# Patient Record
Sex: MALE | Race: WHITE | URBAN - METROPOLITAN AREA
[De-identification: names, ages, dates, MRNs, and addresses within clinical notes are randomized per-mention and may not be internally consistent; named-entity substitution may affect disease eponyms.]

---

## 2024-02-05 RX ORDER — ACETAMINOPHEN 325 MG/1
650 TABLET ORAL 2 TIMES DAILY
COMMUNITY

## 2024-02-05 RX ORDER — ONDANSETRON HYDROCHLORIDE 8 MG/1
8 TABLET, FILM COATED ORAL 3 TIMES DAILY
COMMUNITY

## 2024-02-05 RX ORDER — DONEPEZIL HYDROCHLORIDE 10 MG/1
10 TABLET, FILM COATED ORAL NIGHTLY
COMMUNITY

## 2024-02-05 RX ORDER — LANOLIN ALCOHOL/MO/W.PET/CERES
1000 CREAM (GRAM) TOPICAL DAILY
COMMUNITY

## 2024-02-05 RX ORDER — MEMANTINE HYDROCHLORIDE 10 MG/1
10 TABLET ORAL 2 TIMES DAILY
COMMUNITY

## 2024-02-05 RX ORDER — OLANZAPINE 2.5 MG/1
2.5 TABLET, FILM COATED ORAL NIGHTLY
COMMUNITY

## 2024-02-05 NOTE — PROGRESS NOTES
Date and time of surgery : 2/6/24 at 1200             Arrival Time:  1100     Bring Picture ID and insurance card.  Please wear simple, loose fitting clothing to the hospital.   Do not bring valuables (money, credit cards, checkbooks, etc.)   DO NOT wear any jewelry or piercings on day of surgery.  All body piercing jewelry must be removed.  If you have dentures, they will be removed before going to the OR; we will provide you a container.  If you wear contact lenses or glasses, they will be removed; please bring a case for them.  Shower the evening before or morning of surgery with antibacterial soap.  Nothing to eat or drink after midnight the day before surgery.   You may brush your teeth and gargle the morning of surgery.  DO NOT SWALLOW WATER.   Do not take any morning meds the day of your surgery.  Aspirin, Ibuprofen, Advil, Naproxen, Vitamin E and other Anti-inflammatory products and supplements should be stopped for 5 -7days before surgery or as directed by your physician.  Do not smoke or drink any alcoholic beverages 24 hours prior to surgery.  This includes NA Beer. Refrain from the usage of any recreational drugs, including non-prescribed prescription drugs.   You MUST plan for a responsible adult to stay on site while you are here and take you home after your surgery. You will not be allowed to leave alone or drive yourself home. It is strongly suggested someone stay with you the first 24 hrs. Your surgery will be cancelled if you do not have a ride home.  To help prevent infection, change your sheets the night before surgery.   If you  have a Living Will and Durable Power of  for Healthcare, please bring in a copy.  Notify your Surgeon if you develop any illness between now and time of surgery. Cough, cold, fever, sore throat, nausea, vomiting, etc.  Please notify your surgeon if you experience dizziness, shortness of breath or blurred vision between now & the time of your surgery  To provide

## 2024-02-05 NOTE — PROGRESS NOTES
Gordo Pack    Age 78 y.o.    male    1945    MRN [id number]    2/13/2024  Arrival Time_____________  OR Time____________85 Min     Procedure(s):  SURGICAL EXTRACTION TOOTH # 12                      General   Surgeon(s):  Kev Smith, DMD      DAY ADMIT ___  SDS/OP ___  OUTPT IN BED ___        Phone [phone] (home)     PCP _____________________ Phone_________________ Epic ( ) Epic CE ( ) Appt ________    ADDITIONAL INFO __________________________________ Cardio/Consult _____________    NOTES _____________________________________________________________________    ____________________________________________________________________________    PAT APPT DATE:________ TIME: ________  FAXED QAD: _______  (__) H&P w/ Hospitalist  __________________________________________________________________________  Preop Nurse phone screen complete: _____________  (__) CBC     (__) W/ DIFF ___________     (__) Hgb A1C    ___________  (__) CHEST X RAY   __________  (__) LIPID PROFILE  ___________  (__) EKG   __________  (__) PT-INR / APTT  ___________  (__) PFT's   __________  (__) BMP   ___________  (__) CAROTIDS  __________  (__) CMP   ___________  (__) VEIN MAPPING  __________  (__) U/A   ___________  (__) HISTORY & PHYSICAL __________  (__) URINE C & S  ___________  (__) CARDIAC CLEARANCE __________  (__) U/A W/ FLEX  ___________  (__) PULM. CLEARANCE __________  (__) SERUM PREGNANCY ___________  (__) Check Epic DOS orders __________  (__) TYPE & SCREEN __________repeat ( ) (__)  __________________ __________  (__) Albumin / Prealbumin ___________  (__)  __________________ __________  (__) TRANSFERRIN  ___________  (__)  __________________ __________  (__) LIVER PROFILE  ___________  (__)  __________________ __________  (__) MRSA NASAL SWAB ___________  (__) URINE PREG DOS __________  (__) SED RATE  ___________  (__) BLOOD SUGAR DOS __________  (__) C-REACTIVE PROTEIN ___________    (__) VITAMIN D

## 2024-02-05 NOTE — PROGRESS NOTES
Gordo T Linz    Age 78 y.o.    male    1945    MRN [id number]    2/6/2024  Arrival Time_____________  OR Time____________45 Min     Procedure(s):  SURGICAL EXTRACTION TOOTH # 12                      General    Surgeon(s):  Kev Smith, DMD       Phone [phone] (home)     Initals  Date  Info Source  Home  Cell         Work  _____________________________________________________________________  _____________________________________________________________________  _____________________________________________________________________  _____________________________________________________________________  _____________________________________________________________________    PCP _____________________________ Phone_________________     H&P  ________________  Bringing      Chart              Epic      DOS      Called________  EKG ________________   Bringing      Chart              Epic      DOS      Called________  LABS________________   Bringing     Chart              Epic      DOS      Called________  Cardiac Clearance ______ Bringing      Chart              Epic      DOS      Called________  Pulmonary Clearance____ Bringing      Chart              Epic      DOS      Called________    Cardiologist________________________ Phone___________________________  Pulmonologist_______________________Phone___________________________    ? Advance Directives   ? Church concerns / Waiver on Chart            PAT Communications________________  ? Pre-op Instructions Given /Understood          _________________________________  ? Directions to Surgery Center                          _________________________________  ? Transportation Home_______________      __________________________________  ? Crutches/Walker__________________        __________________________________    Orders: Hard copy/ EPIC                 Transcribed/ EPIC              _______Wt.    ________Pharmacy                         _______SCD

## 2024-02-06 NOTE — PROGRESS NOTES
POA, Riana Connolly, (sister), will not be present DOS. POA can be reached by phone DOS for verbal consent. [phone] or cell [phone]. Family does not want to suspend DNR for surgery. Anesthesia will also need verbal consent. Brother, Eric, bringing patient aware. Brother advised to bring POA, DNR papers DOS. V/U.

## 2024-02-06 NOTE — PROGRESS NOTES
Surgery Date and Time: 2/13/24 @ 11:00 AM   Arrival Time:  09:00 AM    The instructions given when and if a patient needs to stop oral intake prior to surgery varies. Follow the instructions you were given by your    Surgeon or RN during the Pre-op call.       __X__Nothing to eat or to drink after Midnight the night before the surgery. NO gum, mints, candy or ice chips day of surgery.                  Only take the following medications with a small sip of water the morning of surgery: Lorazepam                Aspirin, Ibuprofen, Advil, Naproxen, Vitamin E and other Anti-inflammatory products and supplements should be stopped for 5 -7days before surgery      or as directed by your physician.     - Do not smoke or vape, and do not drink any alcoholic beverages 24 hours prior to surgery, this includes NA Beer. Refrain from using any recreational drugs,     including non-prescribed prescription drugs.       -You may brush your teeth and gargle the morning of surgery.  DO NOT SWALLOW WATER.      -You MUST plan for a responsible adult to stay on site while you are here and take you home after your surgery. You will not be allowed to leave alone or drive               yourself home. It is requested someone stay with you the first 24 hrs. Your surgery will be cancelled if you do not have a ride home with a responsible adult.      -A parent/legal guardian must accompany a child scheduled for surgery and plan to stay at the hospital until the child is discharged.  Please do not bring other                children with you.      -Please wear simple, loose-fitting clothing to the hospital. Do not bring valuables (money, credit cards, checkbooks, etc.) Do not wear any makeup (including                no eye makeup) and no nail polish if applicable.               - DO NOT wear any jewelry or body piercings day of surgery.  All body piercing jewelry must be removed.               - If you have dentures they will be removed

## 2024-02-12 ENCOUNTER — ANESTHESIA EVENT (OUTPATIENT)
Dept: OPERATING ROOM | Age: 79
End: 2024-02-12

## 2024-02-12 RX ORDER — LORAZEPAM 0.5 MG/1
0.5 TABLET ORAL
COMMUNITY

## 2024-02-13 ENCOUNTER — HOSPITAL ENCOUNTER (OUTPATIENT)
Age: 79
Setting detail: OUTPATIENT SURGERY
Discharge: HOME | End: 2024-02-13
Attending: DENTIST | Admitting: DENTIST

## 2024-02-13 ENCOUNTER — ANESTHESIA (OUTPATIENT)
Dept: OPERATING ROOM | Age: 79
End: 2024-02-13

## 2024-02-13 VITALS
WEIGHT: 180 LBS | HEIGHT: 69 IN | RESPIRATION RATE: 8 BRPM | OXYGEN SATURATION: 95 % | DIASTOLIC BLOOD PRESSURE: 67 MMHG | TEMPERATURE: 98.2 F | SYSTOLIC BLOOD PRESSURE: 128 MMHG | BODY MASS INDEX: 26.66 KG/M2 | HEART RATE: 77 BPM

## 2024-02-13 PROCEDURE — A4217 STERILE WATER/SALINE, 500 ML: HCPCS | Performed by: DENTIST

## 2024-02-13 PROCEDURE — 2500000003 HC RX 250 WO HCPCS: Performed by: NURSE ANESTHETIST, CERTIFIED REGISTERED

## 2024-02-13 PROCEDURE — 7100000000 HC PACU RECOVERY - FIRST 15 MIN: Performed by: DENTIST

## 2024-02-13 PROCEDURE — 2709999900 HC NON-CHARGEABLE SUPPLY: Performed by: DENTIST

## 2024-02-13 PROCEDURE — 7100000011 HC PHASE II RECOVERY - ADDTL 15 MIN: Performed by: DENTIST

## 2024-02-13 PROCEDURE — 6370000000 HC RX 637 (ALT 250 FOR IP): Performed by: DENTIST

## 2024-02-13 PROCEDURE — 3600000003 HC SURGERY LEVEL 3 BASE: Performed by: DENTIST

## 2024-02-13 PROCEDURE — 2500000003 HC RX 250 WO HCPCS: Performed by: DENTIST

## 2024-02-13 PROCEDURE — 3700000001 HC ADD 15 MINUTES (ANESTHESIA): Performed by: DENTIST

## 2024-02-13 PROCEDURE — 2580000003 HC RX 258: Performed by: DENTIST

## 2024-02-13 PROCEDURE — 7100000010 HC PHASE II RECOVERY - FIRST 15 MIN: Performed by: DENTIST

## 2024-02-13 PROCEDURE — 3700000000 HC ANESTHESIA ATTENDED CARE: Performed by: DENTIST

## 2024-02-13 PROCEDURE — 2580000003 HC RX 258: Performed by: ANESTHESIOLOGY

## 2024-02-13 PROCEDURE — 7100000001 HC PACU RECOVERY - ADDTL 15 MIN: Performed by: DENTIST

## 2024-02-13 PROCEDURE — 6360000002 HC RX W HCPCS: Performed by: DENTIST

## 2024-02-13 PROCEDURE — 6360000002 HC RX W HCPCS: Performed by: NURSE ANESTHETIST, CERTIFIED REGISTERED

## 2024-02-13 PROCEDURE — 3600000013 HC SURGERY LEVEL 3 ADDTL 15MIN: Performed by: DENTIST

## 2024-02-13 RX ORDER — AMOXICILLIN AND CLAVULANATE POTASSIUM 250; 62.5 MG/5ML; MG/5ML
250 POWDER, FOR SUSPENSION ORAL 2 TIMES DAILY
COMMUNITY

## 2024-02-13 RX ORDER — ONDANSETRON 2 MG/ML
4 INJECTION INTRAMUSCULAR; INTRAVENOUS
Status: DISCONTINUED | OUTPATIENT
Start: 2024-02-13 | End: 2024-02-13 | Stop reason: HOSPADM

## 2024-02-13 RX ORDER — ACETAMINOPHEN 650 MG/20.3ML
15 SUSPENSION ORAL EVERY 4 HOURS PRN
COMMUNITY

## 2024-02-13 RX ORDER — SODIUM CHLORIDE 0.9 % (FLUSH) 0.9 %
5-40 SYRINGE (ML) INJECTION EVERY 12 HOURS SCHEDULED
Status: DISCONTINUED | OUTPATIENT
Start: 2024-02-13 | End: 2024-02-13 | Stop reason: HOSPADM

## 2024-02-13 RX ORDER — PROPOFOL 10 MG/ML
INJECTION, EMULSION INTRAVENOUS PRN
Status: DISCONTINUED | OUTPATIENT
Start: 2024-02-13 | End: 2024-02-13 | Stop reason: SDUPTHER

## 2024-02-13 RX ORDER — CHLORHEXIDINE GLUCONATE ORAL RINSE 1.2 MG/ML
15 SOLUTION DENTAL
Status: DISCONTINUED | OUTPATIENT
Start: 2024-02-13 | End: 2024-02-13 | Stop reason: HOSPADM

## 2024-02-13 RX ORDER — OXYMETAZOLINE HYDROCHLORIDE 0.05 G/100ML
2 SPRAY NASAL ONCE
Status: DISCONTINUED | OUTPATIENT
Start: 2024-02-13 | End: 2024-02-13 | Stop reason: HOSPADM

## 2024-02-13 RX ORDER — LIDOCAINE HYDROCHLORIDE 10 MG/ML
1 INJECTION, SOLUTION EPIDURAL; INFILTRATION; INTRACAUDAL; PERINEURAL
Status: DISCONTINUED | OUTPATIENT
Start: 2024-02-13 | End: 2024-02-13 | Stop reason: HOSPADM

## 2024-02-13 RX ORDER — CHLORHEXIDINE GLUCONATE ORAL RINSE 1.2 MG/ML
SOLUTION DENTAL PRN
Status: DISCONTINUED | OUTPATIENT
Start: 2024-02-13 | End: 2024-02-13 | Stop reason: ALTCHOICE

## 2024-02-13 RX ORDER — MAGNESIUM HYDROXIDE 1200 MG/15ML
LIQUID ORAL CONTINUOUS PRN
Status: COMPLETED | OUTPATIENT
Start: 2024-02-13 | End: 2024-02-13

## 2024-02-13 RX ORDER — OXYCODONE HYDROCHLORIDE 5 MG/1
5 TABLET ORAL
Status: DISCONTINUED | OUTPATIENT
Start: 2024-02-13 | End: 2024-02-13 | Stop reason: HOSPADM

## 2024-02-13 RX ORDER — SODIUM CHLORIDE 9 MG/ML
INJECTION, SOLUTION INTRAVENOUS PRN
Status: DISCONTINUED | OUTPATIENT
Start: 2024-02-13 | End: 2024-02-13 | Stop reason: HOSPADM

## 2024-02-13 RX ORDER — LIDOCAINE HYDROCHLORIDE 20 MG/ML
INJECTION, SOLUTION INFILTRATION; PERINEURAL PRN
Status: DISCONTINUED | OUTPATIENT
Start: 2024-02-13 | End: 2024-02-13 | Stop reason: SDUPTHER

## 2024-02-13 RX ORDER — MEPERIDINE HYDROCHLORIDE 50 MG/ML
12.5 INJECTION INTRAMUSCULAR; INTRAVENOUS; SUBCUTANEOUS EVERY 5 MIN PRN
Status: DISCONTINUED | OUTPATIENT
Start: 2024-02-13 | End: 2024-02-13 | Stop reason: HOSPADM

## 2024-02-13 RX ORDER — SODIUM CHLORIDE, SODIUM LACTATE, POTASSIUM CHLORIDE, CALCIUM CHLORIDE 600; 310; 30; 20 MG/100ML; MG/100ML; MG/100ML; MG/100ML
INJECTION, SOLUTION INTRAVENOUS CONTINUOUS
Status: DISCONTINUED | OUTPATIENT
Start: 2024-02-13 | End: 2024-02-13 | Stop reason: HOSPADM

## 2024-02-13 RX ORDER — SODIUM CHLORIDE 0.9 % (FLUSH) 0.9 %
5-40 SYRINGE (ML) INJECTION PRN
Status: DISCONTINUED | OUTPATIENT
Start: 2024-02-13 | End: 2024-02-13 | Stop reason: HOSPADM

## 2024-02-13 RX ORDER — LIDOCAINE HYDROCHLORIDE AND EPINEPHRINE 10; 10 MG/ML; UG/ML
INJECTION, SOLUTION INFILTRATION; PERINEURAL PRN
Status: DISCONTINUED | OUTPATIENT
Start: 2024-02-13 | End: 2024-02-13 | Stop reason: ALTCHOICE

## 2024-02-13 RX ORDER — CHLORHEXIDINE GLUCONATE ORAL RINSE 1.2 MG/ML
15 SOLUTION DENTAL ONCE
Status: DISCONTINUED | OUTPATIENT
Start: 2024-02-13 | End: 2024-02-13 | Stop reason: HOSPADM

## 2024-02-13 RX ORDER — MIDAZOLAM HYDROCHLORIDE 1 MG/ML
2 INJECTION INTRAMUSCULAR; INTRAVENOUS
Status: DISCONTINUED | OUTPATIENT
Start: 2024-02-13 | End: 2024-02-13 | Stop reason: HOSPADM

## 2024-02-13 RX ORDER — CEFAZOLIN SODIUM IN 0.9 % NACL 2 G/100 ML
2000 PLASTIC BAG, INJECTION (ML) INTRAVENOUS
Status: COMPLETED | OUTPATIENT
Start: 2024-02-13 | End: 2024-02-13

## 2024-02-13 RX ORDER — DEXAMETHASONE SODIUM PHOSPHATE 4 MG/ML
INJECTION, SOLUTION INTRA-ARTICULAR; INTRALESIONAL; INTRAMUSCULAR; INTRAVENOUS; SOFT TISSUE PRN
Status: DISCONTINUED | OUTPATIENT
Start: 2024-02-13 | End: 2024-02-13 | Stop reason: SDUPTHER

## 2024-02-13 RX ORDER — SUCCINYLCHOLINE CHLORIDE 20 MG/ML
INJECTION INTRAMUSCULAR; INTRAVENOUS PRN
Status: DISCONTINUED | OUTPATIENT
Start: 2024-02-13 | End: 2024-02-13 | Stop reason: SDUPTHER

## 2024-02-13 RX ORDER — IPRATROPIUM BROMIDE AND ALBUTEROL SULFATE 2.5; .5 MG/3ML; MG/3ML
1 SOLUTION RESPIRATORY (INHALATION)
Status: DISCONTINUED | OUTPATIENT
Start: 2024-02-13 | End: 2024-02-13 | Stop reason: HOSPADM

## 2024-02-13 RX ORDER — OXYCODONE HYDROCHLORIDE 5 MG/1
10 TABLET ORAL PRN
Status: DISCONTINUED | OUTPATIENT
Start: 2024-02-13 | End: 2024-02-13 | Stop reason: HOSPADM

## 2024-02-13 RX ORDER — ONDANSETRON 2 MG/ML
INJECTION INTRAMUSCULAR; INTRAVENOUS PRN
Status: DISCONTINUED | OUTPATIENT
Start: 2024-02-13 | End: 2024-02-13 | Stop reason: SDUPTHER

## 2024-02-13 RX ORDER — PROCHLORPERAZINE EDISYLATE 5 MG/ML
5 INJECTION INTRAMUSCULAR; INTRAVENOUS
Status: DISCONTINUED | OUTPATIENT
Start: 2024-02-13 | End: 2024-02-13 | Stop reason: HOSPADM

## 2024-02-13 RX ADMIN — SUCCINYLCHOLINE CHLORIDE 140 MG: 20 INJECTION, SOLUTION INTRAMUSCULAR; INTRAVENOUS at 10:44

## 2024-02-13 RX ADMIN — ONDANSETRON 2 MG: 2 INJECTION INTRAMUSCULAR; INTRAVENOUS at 10:50

## 2024-02-13 RX ADMIN — DEXAMETHASONE SODIUM PHOSPHATE 8 MG: 4 INJECTION, SOLUTION INTRAMUSCULAR; INTRAVENOUS at 10:50

## 2024-02-13 RX ADMIN — LIDOCAINE HYDROCHLORIDE 100 MG: 20 INJECTION, SOLUTION INFILTRATION; PERINEURAL at 10:44

## 2024-02-13 RX ADMIN — PROPOFOL 120 MG: 10 INJECTION, EMULSION INTRAVENOUS at 10:44

## 2024-02-13 RX ADMIN — Medication 2000 MG: at 10:47

## 2024-02-13 RX ADMIN — SODIUM CHLORIDE, SODIUM LACTATE, POTASSIUM CHLORIDE, AND CALCIUM CHLORIDE: .6; .31; .03; .02 INJECTION, SOLUTION INTRAVENOUS at 10:38

## 2024-02-13 NOTE — PROGRESS NOTES
Patient meets criteria for discharge per policy.  Discharge instructions given to pt's family, verbalized understanding. PIV removed. Patient discharged via wheelchair to the care of their family in stable condition.

## 2024-02-13 NOTE — PROGRESS NOTES
Patient arrived to PACU bay 7, phase one initiated. Placed on bedside monitor, VSS. Report obtained from OR RN and anesthesia. Patient on room air. See flowsheets for assessment. Warm blankets applied. Side rails in place, will monitor patient closely.

## 2024-02-13 NOTE — H&P
The H&P in EPIC was reviewed, the patient was examined, and no change has occurred in the patient's condition since the H&P was completed.    Electronically signed by Kev Smith DMD on 2/13/2024 at 10:06 AM

## 2024-02-13 NOTE — PROGRESS NOTES
Patient admitted to [facility] room 3 in preparation for surgery, VSS. Pt is nonverbal. Consent confirmed with POA (sister Riana)  IV inserted into right FA, LR infusing. Belongings placed on [facility] cart. NPO since 1800. Family at bedside, phone number in system for text updates, call light within reach.

## 2024-02-13 NOTE — DISCHARGE INSTRUCTIONS
Given to patient in written     ANESTHESIA DISCHARGE INSTRUCTIONS    You are under the influence of drugs- do not drink alcohol, drive a car, operate machinery(such as power tools, kitchen appliances, etc), sign legal documents, or make any important decisions for 24 hours (or while on pain medications).   Children should not ride bikes or skate boards or play on gym sets  for 24 hours after surgery.  A responsible adult should be with you for 24 hours.  Rest at home today- increase activity as tolerated.  Progress slowly to a regular diet unless your physician has instructed you otherwise. Drink plenty of water.    CALL YOUR DOCTOR IF YOU:  Have moderate to severe nausea or vomiting AND are unable to hold down fluids or prescribed medications.  Have bright red bloody drainage from your dressing that won't stop oozing.  Do not get relief with your pain medication    NORMAL (POSSIBLE) SIDE EFFECTS FROM ANESTHESIA:     Confusion, temporary memory loss, delayed reaction times in the first 24 hours  Lightheadedness, dizziness, difficulty focusing, blurred vision  Nausea/vomiting can happen  Shivering, feeling cold, sore throat, cough and muscle aches should stop within 24-48 hours  Trouble urinating - call your surgeon if it has been more than 8 hrs  Bruising or soreness at the IV site - call if it remains red, firm or there is drainage             The following instructions are to be followed if you have a known history or diagnosis of sleep apnea:  For all sleep apnea patients:  ? Sleep on your side or sitting up in a chair whenever possible, especially the first 24 hours after surgery.  ? Use only medicines prescribed by your doctor.    ? Do not drink alcohol.  ? If you have a dental device to assist you while at rest, use it at all times for the first 24 hours.  For patients using CPAP machines:  ? Use your CPAP machine during all periods of sleep as usual.  ? Use your CPAP machine during all periods of daytime rest

## 2024-02-13 NOTE — PROGRESS NOTES
Patient alert, off oxygen, tolerating PO. Phase II initiated, VSS.  Pt's family members at bedside.

## 2024-02-13 NOTE — ANESTHESIA PRE PROCEDURE
Department of Anesthesiology  Preprocedure Note       Name:  Gordo Pack   Age:  78 y.o.  :  1945                                          MRN:  [id number]         Date:  2024      Surgeon: Surgeon(s):  Kev Smith DMD    Procedure: Procedure(s):  SURGICAL EXTRACTION TOOTH # 12    Medications prior to admission:   Prior to Admission medications    Medication Sig Start Date End Date Taking? Authorizing Provider   amoxicillin-clavulanate (AUGMENTIN) 250-62.5 MG/5ML suspension Take 5 mLs by mouth 2 times daily   Yes Jl Young MD   acetaminophen (TYLENOL) 650 MG/20.3ML SUSP Take 15 mg/kg by mouth every 4 hours as needed for Pain   Yes Jl Young MD   LORazepam (ATIVAN) 0.5 MG tablet Take 1 tablet by mouth. Indications: take one tablet prior to dental procedure   Yes Jl Young MD   donepezil (ARICEPT) 10 MG tablet Take 1 tablet by mouth nightly   Yes Jl Young MD   memantine (NAMENDA) 10 MG tablet Take 1 tablet by mouth 2 times daily   Yes Jl Young MD   OLANZapine (ZYPREXA) 2.5 MG tablet Take 1 tablet by mouth nightly   Yes Jl Young MD   acetaminophen (TYLENOL) 325 MG tablet Take 2 tablets by mouth in the morning and at bedtime   Yes Jl Young MD   IBUPROFEN PO Take by mouth as needed Liquid   Yes Jl Young MD   ondansetron (ZOFRAN) 8 MG tablet Take 1 tablet by mouth in the morning, at noon, and at bedtime   Yes Jl Young MD   vitamin B-12 (CYANOCOBALAMIN) 1000 MCG tablet Take 1 tablet by mouth daily   Yes Jl Young MD   Cholecalciferol (VITAMIN D) 2000 UNITS CAPS capsule Take by mouth daily    Jl Young MD   LISINOPRIL PO Take 5 mg by mouth daily    Jl oYung MD   simvastatin (ZOCOR) 10 MG tablet Take 4 tablets by mouth nightly    Jl Young MD       Current medications:    Current Facility-Administered Medications   Medication Dose Route

## 2024-02-13 NOTE — OP NOTE
Operative Note      Patient: Gordo Pack  YOB: 1945  MRN: [id number]    Date of Procedure: 2/13/2024    Pre-Op Diagnosis Codes:     * Non-restorable tooth [K08.89] #12    Post-Op Diagnosis: Same       Procedure(s):  SURGICAL EXTRACTION TOOTH # 12    Surgeon(s):  Kev Smith DMD    Assistant:   Surgical Assistant: Karime Lewis     Anesthesia: General    Estimated Blood Loss (mL): Minimal    Complications: None    Specimens: None    Implants: None      Drains: None    Findings: carious #12    Detailed Description of Procedure:     The patient was placed in supine position on the operating table. Following induction of general anesthesia, an oral endotracheal tube was successfully placed and secured. Patient was draped in sterile fashion. 2cc of 1% lidocaine was infiltrated at surgical sites.     Tooth #12 was surgically removed after elevating a full thickness mucoperisteal flap with a 15 blade. Gelfoam was placed in the socket and the site sutures using 3.0 chromic gut.     Throat pack removed.     At the conclusion of the procedure the patient emerged from anesthesia without complication. He was taken to the PACU postoperatively.         Electronically signed by Kev Smith DMD on 2/13/2024 at 11:06 AM

## 2024-02-13 NOTE — ANESTHESIA POSTPROCEDURE EVALUATION
Department of Anesthesiology  Postprocedure Note    Patient: Gordo Pack  MRN: [id number]  YOB: 1945  Date of evaluation: 2/13/2024    Procedure Summary       Date: 02/13/24 Room / Location: [address]    Anesthesia Start: 1038 Anesthesia Stop: 1109    Procedure: SURGICAL EXTRACTION TOOTH # 12 (Mouth) Diagnosis:       Non-restorable tooth      (Non-restorable tooth [K08.89])    Surgeons: Kev Smith DMD Responsible Provider: Allen Valdovinos MD    Anesthesia Type: general ASA Status: 3            Anesthesia Type: No value filed.    Leland Phase I: Leland Score: 10    Leland Phase II: Leland Score: 10    Anesthesia Post Evaluation    Patient location during evaluation: PACU  Patient participation: complete - patient participated  Level of consciousness: awake and alert  Airway patency: patent  Nausea & Vomiting: no nausea and no vomiting  Cardiovascular status: hemodynamically stable  Respiratory status: acceptable  Hydration status: euvolemic  Pain management: adequate    No notable events documented.

## 2024-07-01 ENCOUNTER — HOSPITAL ENCOUNTER (OUTPATIENT)
Dept: SPEECH THERAPY | Age: 79
Setting detail: THERAPIES SERIES
Discharge: HOME | End: 2024-07-01
Payer: MEDICARE

## 2024-07-01 ENCOUNTER — HOSPITAL ENCOUNTER (OUTPATIENT)
Dept: GENERAL RADIOLOGY | Age: 79
Discharge: HOME | End: 2024-07-01
Payer: MEDICARE

## 2024-07-01 DIAGNOSIS — R13.10 DYSPHAGIA, UNSPECIFIED TYPE: ICD-10-CM

## 2024-07-01 PROCEDURE — 92611 MOTION FLUOROSCOPY/SWALLOW: CPT

## 2024-07-01 PROCEDURE — 74230 X-RAY XM SWLNG FUNCJ C+: CPT

## 2024-07-01 NOTE — PROCEDURES
INSTRUMENTAL SWALLOW REPORT  MODIFIED BARIUM SWALLOW    NAME: Gordo Pack     : 1945  MRN: [id number]       Date of Eval: 2024     Ordering Physician: Kate Ly MD  Referring Diagnosis: Dysphagia    Past Medical History:  has a past medical history of Alzheimer's dementia (HCC), Ankle edema, Arthritis, Bradycardia, Cellulitis, Choking, Dementia (HCC), Depression, Developmental disorder, Hyperlipidemia, Hypertension, Infected dental caries, Paget's bone disease, Periapical abscess without sinus, Speech abnormality, and Trigger finger.  Past Surgical History:  has a past surgical history that includes eye surgery (Left); Finger trigger release (Left, 2014); Finger surgery (Left, 2015); Dental surgery (); and Dental surgery (N/A, 2024).    CXR: N/A    Prior MBS Results: N/A    Patient Complaints/Reason for Referral:  Gordo Pack was referred for a MBS to assess the efficiency of his/her swallow function, assess for aspiration, and to make recommendations regarding safe dietary consistencies, effective compensatory strategies, and safe eating environment.    Onset of problem: 26 Pt's family member endorsed ongoing concerns for dysphagia 2/2 coughing and throat clearing over ~ the past year. Pt's sister endorsed pt consumed softer, cut up solids, prior to this assessment.     Behavior/Cognition: Pt alert, nonverbal (baseline per pt's sister 2/2 development disability), and accepting of all boluses during study.  Vision/Hearing: Wears glasses all of the time. Hearing WFL.    Impressions:  Pt presents w/ mild, yet functional, oropharyngeal dysphagia. Pt seated upright and readily accepted all PO. Pt demo'd slightly impaired bolus cohesion and A-P transit as evidenced by posterior escape and piecemeal swallowing ~1/2 of bolus when given thin liquids via single and rapid straw sips. Noted oral stasis and pocketing of soft solids. Required SLP to expectorate whole,

## 2024-12-02 ENCOUNTER — HOSPITAL ENCOUNTER (OUTPATIENT)
Dept: GENERAL RADIOLOGY | Age: 79
Discharge: HOME | End: 2024-12-02
Payer: MEDICARE

## 2024-12-02 DIAGNOSIS — R13.10 DYSPHAGIA, UNSPECIFIED TYPE: ICD-10-CM

## 2024-12-02 DIAGNOSIS — R09.89 CHOKING EPISODE: ICD-10-CM

## 2024-12-02 PROCEDURE — 74230 X-RAY XM SWLNG FUNCJ C+: CPT

## 2024-12-02 PROCEDURE — 92611 MOTION FLUOROSCOPY/SWALLOW: CPT

## 2024-12-02 NOTE — PROCEDURES
INSTRUMENTAL SWALLOW REPORT  MODIFIED BARIUM SWALLOW    NAME: Gordo Pack     : 1945  MRN: [id number]       Date of Eval: 2024     Ordering Physician: Dr. Ly  Referring Diagnosis: Dysphagia    Past Medical History:  has a past medical history of Alzheimer's dementia (HCC), Ankle edema, Arthritis, Bradycardia, Cellulitis, Choking, Dementia (HCC), Depression, Developmental disorder, Hyperlipidemia, Hypertension, Infected dental caries, Paget's bone disease, Periapical abscess without sinus, Speech abnormality, and Trigger finger.  Past Surgical History:  has a past surgical history that includes eye surgery (Left); Finger trigger release (Left, 2014); Finger surgery (Left, 2015); Dental surgery (); and Dental surgery (N/A, 2024).    CXR: none recent     Prior MBS Results: 24  Pt presents w/ mild, yet functional, oropharyngeal dysphagia. Pt seated upright and readily accepted all PO. Pt demo'd slightly impaired bolus cohesion and A-P transit as evidenced by posterior escape and piecemeal swallowing ~1/2 of bolus when given thin liquids via single and rapid straw sips. Noted oral stasis and pocketing of soft solids. Required SLP to expectorate whole, un-masticated bolus out of oral cavity via toothette at the end of the study. Despite delayed swallow initiation w/ posterior loss of thin liquids to the level of the pyriforms pt demo'd adequate airway protection with no instances of penetration or aspiration across all trials. Slightly reduced tongue base retraction and residuals of oral residue, resulted in trace-min residue lining BOT into the vallecula. Pt able to independently complete a second swallow when residue remained, which was effective in further clearing out pharyngeal space. No UES retention or backflow noted. Based on the assessment SLP recommends Minced and Moist Solids/Thin Liquids w/ the use of the strategies listed below.     Patient

## (undated) DEVICE — PENCIL ES CRD L10FT HND SWCHING ROCK SWCH W/ EDGE COAT BLDE

## (undated) DEVICE — GLOVE ORANGE PI 7 1/2   MSG9075

## (undated) DEVICE — MICRODISSECTION NEEDLE STRAIGHT SLEEVE: Brand: COLORADO

## (undated) DEVICE — PACK PROCEDURE SURG ORAL CDS

## (undated) DEVICE — SUTURE CHROMIC GUT SZ 3-0 L27IN ABSRB BRN L26MM SH 1/2 CIR G122H

## (undated) DEVICE — JAW BRA SURG TMJ VELC CLSR SYS V CUT SUPP STRP 2 ZIPLOK BG

## (undated) DEVICE — GLOVE SURG SZ 8 L12IN FNGR THK79MIL GRN LTX FREE

## (undated) DEVICE — MAGNETIC INSTRUMENT PAD 10" X 16"; MEDIUM; DISPOSABLE: Brand: CARDINAL HEALTH

## (undated) DEVICE — SURGIFOAM SPNG SZ 12-7